# Patient Record
Sex: MALE | Race: WHITE
[De-identification: names, ages, dates, MRNs, and addresses within clinical notes are randomized per-mention and may not be internally consistent; named-entity substitution may affect disease eponyms.]

---

## 2018-10-14 ENCOUNTER — HOSPITAL ENCOUNTER (INPATIENT)
Dept: HOSPITAL 62 - ER | Age: 63
LOS: 1 days | Discharge: HOME | DRG: 310 | End: 2018-10-15
Attending: STUDENT IN AN ORGANIZED HEALTH CARE EDUCATION/TRAINING PROGRAM | Admitting: STUDENT IN AN ORGANIZED HEALTH CARE EDUCATION/TRAINING PROGRAM
Payer: COMMERCIAL

## 2018-10-14 DIAGNOSIS — Z82.49: ICD-10-CM

## 2018-10-14 DIAGNOSIS — Z82.3: ICD-10-CM

## 2018-10-14 DIAGNOSIS — I10: ICD-10-CM

## 2018-10-14 DIAGNOSIS — R06.83: ICD-10-CM

## 2018-10-14 DIAGNOSIS — Z80.0: ICD-10-CM

## 2018-10-14 DIAGNOSIS — I48.91: Primary | ICD-10-CM

## 2018-10-14 LAB
ADD MANUAL DIFF: NO
ALBUMIN SERPL-MCNC: 4.4 G/DL (ref 3.5–5)
ALP SERPL-CCNC: 72 U/L (ref 38–126)
ALT SERPL-CCNC: 28 U/L (ref 21–72)
ANION GAP SERPL CALC-SCNC: 10 MMOL/L (ref 5–19)
AST SERPL-CCNC: 21 U/L (ref 17–59)
BASOPHILS # BLD AUTO: 0.1 10^3/UL (ref 0–0.2)
BASOPHILS NFR BLD AUTO: 1.2 % (ref 0–2)
BILIRUB DIRECT SERPL-MCNC: 0.3 MG/DL (ref 0–0.4)
BILIRUB SERPL-MCNC: 1 MG/DL (ref 0.2–1.3)
BUN SERPL-MCNC: 14 MG/DL (ref 7–20)
CALCIUM: 10.1 MG/DL (ref 8.4–10.2)
CHLORIDE SERPL-SCNC: 102 MMOL/L (ref 98–107)
CHOLEST SERPL-MCNC: 135.39 MG/DL (ref 0–200)
CK MB SERPL-MCNC: 1.39 NG/ML (ref ?–4.55)
CK SERPL-CCNC: 80 U/L (ref 55–170)
CO2 SERPL-SCNC: 30 MMOL/L (ref 22–30)
EOSINOPHIL # BLD AUTO: 0 10^3/UL (ref 0–0.6)
EOSINOPHIL NFR BLD AUTO: 1 % (ref 0–6)
ERYTHROCYTE [DISTWIDTH] IN BLOOD BY AUTOMATED COUNT: 12.7 % (ref 11.5–14)
GLUCOSE SERPL-MCNC: 132 MG/DL (ref 75–110)
HCT VFR BLD CALC: 47.1 % (ref 37.9–51)
HGB BLD-MCNC: 16.2 G/DL (ref 13.5–17)
INR PPP: 0.97
LDLC SERPL DIRECT ASSAY-MCNC: 78 MG/DL (ref ?–100)
LYMPHOCYTES # BLD AUTO: 1.3 10^3/UL (ref 0.5–4.7)
LYMPHOCYTES NFR BLD AUTO: 27.5 % (ref 13–45)
MCH RBC QN AUTO: 32.6 PG (ref 27–33.4)
MCHC RBC AUTO-ENTMCNC: 34.5 G/DL (ref 32–36)
MCV RBC AUTO: 94 FL (ref 80–97)
MONOCYTES # BLD AUTO: 0.6 10^3/UL (ref 0.1–1.4)
MONOCYTES NFR BLD AUTO: 12.3 % (ref 3–13)
NEUTROPHILS # BLD AUTO: 2.7 10^3/UL (ref 1.7–8.2)
NEUTS SEG NFR BLD AUTO: 58 % (ref 42–78)
PLATELET # BLD: 234 10^3/UL (ref 150–450)
POTASSIUM SERPL-SCNC: 4.4 MMOL/L (ref 3.6–5)
PROT SERPL-MCNC: 7.3 G/DL (ref 6.3–8.2)
PROTHROMBIN TIME: 13.4 SEC (ref 11.4–15.4)
RBC # BLD AUTO: 4.98 10^6/UL (ref 4.35–5.55)
SODIUM SERPL-SCNC: 141.6 MMOL/L (ref 137–145)
TOTAL CELLS COUNTED % (AUTO): 100 %
TRIGL SERPL-MCNC: 166 MG/DL (ref ?–150)
TROPONIN I SERPL-MCNC: < 0.012 NG/ML
VLDLC SERPL CALC-MCNC: 33.2 MG/DL (ref 10–31)
WBC # BLD AUTO: 4.6 10^3/UL (ref 4–10.5)

## 2018-10-14 PROCEDURE — 82550 ASSAY OF CK (CPK): CPT

## 2018-10-14 PROCEDURE — 71045 X-RAY EXAM CHEST 1 VIEW: CPT

## 2018-10-14 PROCEDURE — 80048 BASIC METABOLIC PNL TOTAL CA: CPT

## 2018-10-14 PROCEDURE — 82553 CREATINE MB FRACTION: CPT

## 2018-10-14 PROCEDURE — 93005 ELECTROCARDIOGRAM TRACING: CPT

## 2018-10-14 PROCEDURE — 84443 ASSAY THYROID STIM HORMONE: CPT

## 2018-10-14 PROCEDURE — 80053 COMPREHEN METABOLIC PANEL: CPT

## 2018-10-14 PROCEDURE — 99291 CRITICAL CARE FIRST HOUR: CPT

## 2018-10-14 PROCEDURE — 83735 ASSAY OF MAGNESIUM: CPT

## 2018-10-14 PROCEDURE — 93010 ELECTROCARDIOGRAM REPORT: CPT

## 2018-10-14 PROCEDURE — 36415 COLL VENOUS BLD VENIPUNCTURE: CPT

## 2018-10-14 PROCEDURE — 85610 PROTHROMBIN TIME: CPT

## 2018-10-14 PROCEDURE — 84484 ASSAY OF TROPONIN QUANT: CPT

## 2018-10-14 PROCEDURE — 93306 TTE W/DOPPLER COMPLETE: CPT

## 2018-10-14 PROCEDURE — 80061 LIPID PANEL: CPT

## 2018-10-14 PROCEDURE — 96374 THER/PROPH/DIAG INJ IV PUSH: CPT

## 2018-10-14 PROCEDURE — 85025 COMPLETE CBC W/AUTO DIFF WBC: CPT

## 2018-10-14 PROCEDURE — 85730 THROMBOPLASTIN TIME PARTIAL: CPT

## 2018-10-14 RX ADMIN — FAMOTIDINE SCH MG: 20 TABLET, FILM COATED ORAL at 21:11

## 2018-10-14 RX ADMIN — METOPROLOL TARTRATE SCH MG: 25 TABLET, FILM COATED ORAL at 21:12

## 2018-10-14 RX ADMIN — METOPROLOL TARTRATE SCH MG: 25 TABLET, FILM COATED ORAL at 15:11

## 2018-10-14 RX ADMIN — APIXABAN SCH MG: 5 TABLET, FILM COATED ORAL at 21:12

## 2018-10-14 RX ADMIN — APIXABAN SCH MG: 5 TABLET, FILM COATED ORAL at 15:12

## 2018-10-14 NOTE — ER DOCUMENT REPORT
ED Cardiac





- General


Chief Complaint: Arrhythmia


Stated Complaint: BREATHING PROBLEM


Time Seen by Provider: 10/14/18 12:16


TRAVEL OUTSIDE OF THE [place] IN LAST 30 DAYS: No





- HPI


Notes: 





Patient is a 64 yo male that presents to the emergency department for chief 

complaint of palpitations and near syncope.


Patient presents for shortness of breath, palpitations and lightheadedness.  He 

states that he felt like his heart was beating fast and irregular.  During this 

time he felt like he could not catch his breath and he was going to pass out.  

The symptoms greatly resolved just prior to arrival.  He currently states he 

does not feel lightheaded.  He did report a tightness in his chest with this 

that he describes as a hard time breathing.  He has had similar symptoms over 

the last few months that have only lasted for a few minutes at a time and he 

has never felt this lightheaded with them before.  He denies history of atrial 

fibrillation or cardiac disease.  He has a history of hypertension and takes 1 

lisinopril daily which he did take today.  He does report that he is under 

increased stress recently.





Past Medical History: Hypertension





Past Surgical History: Reviewed





Social History: Occasional alcohol.  Denies tobacco and drug use





Family History: Reviewed and noncontributory for presenting illness





Allergies: Reviewed, see documented allergy list.








REVIEW OF SYSTEMS:





CONSTITUTIONAL : 





No fever





No chills





No diaphoresis





No recent illness





EENT:





No vision changes





No congestion





No sore throat  





CARDIOVASCULAR:





Nchest pain





palpitations





RESPIRATORY:





shortness of breath





No cough





No difficulty breathing





GASTROINTESTINAL: 





No abdominal pain





No nausea





No vomiting





No diarrhea





GENITOURINARY:





No dysuria





No hematuria





No difficulty urinating





MUSCULOSKELETAL:





No back pain





No leg pain





No arm pain





SKIN:  





No rashes





No lesions





LYMPHATIC: 





No swollen, enlarged glands.





NEUROLOGICAL: 





No lightheadedness





No headache





No weakness





No paresthesias





PSYCHIATRIC:





No anxiety





No depression 








PHYSICAL EXAMINATION:





Vital signs reviewed, nursing noted reviewed.





GENERAL: Well-appearing, well-nourished and in no acute distress.





HEAD: Atraumatic, normocephalic.





EYES: Eyes appear normal, extraocular movements intact, sclera anicteric, 

conjunctiva are normal.





ENT: nares patent, oropharynx clear without exudates.  Moist mucous membranes.





NECK: Normal range of motion, supple without lymphadenopathy





LUNGS: Breath sounds clear to auscultation bilaterally and equal.  No wheezes 

rales or rhonchi.





HEART: Irregularly irregular and tachycardic





ABDOMEN: Soft, nontender, normoactive bowel sounds.  No rebound, guarding, or 

rigidity. No masses appreciated.





EXTREMITIES: Nontender, good range of motion, no pitting or edema. 





NEUROLOGICAL: No focal neurological deficits. Moves all extremities 

spontaneously Motor and sensory grossly intact on exam.





PSYCH: Normal mood, normal affect.





SKIN: Warm, Dry, normal turgor, no rashes or lesions noted on exposed skin











- Related Data


Allergies/Adverse Reactions: 


 





No Known Allergies Allergy (Unverified 10/14/18 12:11)


 











Past Medical History





- Social History


Smoking Status: Never Smoker


Chew tobacco use (# tins/day): No


Frequency of alcohol use: None


Family History: Reviewed & Not Pertinent


Patient has suicidal ideation: No


Patient has homicidal ideation: No





- Past Medical History


Cardiac Medical History: Reports: Hx Hypertension


Renal/ Medical History: Denies: Hx Peritoneal Dialysis





Review of Systems





- Review of Systems


Notes: 





Dictated





Physical Exam





- Vital signs


Vitals: 





 











Temp Pulse Resp BP Pulse Ox


 


 97.4 F   111 H  14   135/96 H  97 


 


 10/14/18 11:45  10/14/18 11:45  10/14/18 11:45  10/14/18 11:45  10/14/18 11:45














- Notes


Notes: 





Dictated





Course





- Re-evaluation


Re-evalutation: 





10/14/18 14:00


Vitals reviewed.  Nursing notes reviewed.  EKG shows A. fib with RVR.  Patient 

was given Cardizem which improved his rate and placed on Cardizem infusion for 

further rate control.  His blood pressure has remained stable.  Lab work is 

unremarkable.  Chest x-ray shows no acute process.  This is new onset A. fib 

and he is not anticoagulated.  I discussed his care with Dr. Min who will see 

him in the emergency room on consultation.  He will be admitted to the hospital 

for further telemetry monitoring and treatment.  Case discussed with Dr. Ryder 

who accepted admission.





Laboratory











  10/14/18 10/14/18 10/14/18





  12:05 12:05 12:05


 


WBC  4.6  


 


RBC  4.98  


 


Hgb  16.2  


 


Hct  47.1  


 


MCV  94  


 


MCH  32.6  


 


MCHC  34.5  


 


RDW  12.7  


 


Plt Count  234  


 


Seg Neutrophils %  58.0  


 


Lymphocytes %  27.5  


 


Monocytes %  12.3  


 


Eosinophils %  1.0  


 


Basophils %  1.2  


 


Absolute Neutrophils  2.7  


 


Absolute Lymphocytes  1.3  


 


Absolute Monocytes  0.6  


 


Absolute Eosinophils  0.0  


 


Absolute Basophils  0.1  


 


Sodium   141.6 


 


Potassium   4.4 


 


Chloride   102 


 


Carbon Dioxide   30 


 


Anion Gap   10 


 


BUN   14 


 


Creatinine   0.95 


 


Est GFR ( Amer)   > 60 


 


Est GFR (Non-Af Amer)   > 60 


 


Glucose   132 H 


 


Calcium   10.1 


 


Total Bilirubin   1.0 


 


Direct Bilirubin   0.3 


 


Neonat Total Bilirubin   Not Reportable 


 


Neonat Direct Bilirubin   Not Reportable 


 


Neonat Indirect Bili   Not Reportable 


 


AST   21 


 


ALT   28 


 


Alkaline Phosphatase   72 


 


Creatine Kinase   80 


 


CK-MB (CK-2)    1.39


 


Troponin I    < 0.012


 


Total Protein   7.3 


 


Albumin   4.4 














 





Chest X-Ray  10/14/18 12:12


IMPRESSION:  NO ACUTE FINDINGS.


 














- Vital Signs


Vital signs: 





 











Temp Pulse Resp BP Pulse Ox


 


 97.4 F   111 H  14   135/96 H  97 


 


 10/14/18 11:45  10/14/18 11:45  10/14/18 11:45  10/14/18 11:45  10/14/18 11:45














- Laboratory


Result Diagrams: 


 10/14/18 12:05





 10/14/18 12:05


Laboratory results interpreted by me: 





 











  10/14/18





  12:05


 


Glucose  132 H














- EKG Interpretation by Me


Additional EKG results interpreted by me: 





10/14/18 14:01


Interpreted by myself


1147: Atrial fibrillation, rate 106, normal axis, no ectopy, normal QTC, no ST 

elevation





Critical Care Note





- Critical Care Note


Total time excluding time spent on procedures (mins): 35 - Cardiac dysrhythmia 

requiring Cardizem infusion, conversations with specialist, conversations with 

family, potential for cardiovascular decompensation.





Discharge





- Discharge


Clinical Impression: 


 Atrial fibrillation with RVR





Condition: Stable


Disposition: ADMITTED AS OBSERVATION


Admitting Provider: Hospitalist


Unit Admitted: [facility]

## 2018-10-14 NOTE — PDOC CONSULTATION
Consultation


Consult Date: 10/14/18


Attending physician:: KISHA TSE


Consult reason:: Atrial fibrillation with rapid ventricular response and near 

syncope





History of Present Illness


Admission Date/PCP: 


  10/14/18 15:20





  JACKIE JESUS MD





Patient complains of: Near syncope


History of Present Illness: 


OPAL GARZA is a 63 year old male that presents to the emergency department 

for chief complaint of palpitations and near syncope.


Patient presents for shortness of breath, palpitations and lightheadedness.  He 

states that he felt like his heart was beating fast and irregular.  During this 

time he felt like he could not catch his breath and he was going to pass out.  

The symptoms greatly resolved just prior to arrival.  He currently states he 

does not feel lightheaded.  He did report a tightness in his chest with this 

that he describes as a hard time breathing.  He has had similar symptoms over 

the last few months that have only lasted for a few minutes at a time and he 

has never felt this lightheaded with them before.  He denies history of atrial 

fibrillation or cardiac disease.  He has a history of hypertension and takes 1 

lisinopril daily which he did take today.  He does report that he is under 

increased stress recently.


This history obtained by the ER physician was reviewed and confirmed with the 

patient.  Patient wife also interviewed.  Patient wife claims that Mr. Garza 

had helped with moving some furniture and loading them onto a truck to be 

transferred to [place] beach house.  However while driving his truck, he felt 

dizzy, lightheaded and claims that he might pass out.  He also mentioned to his 

wife that he was going to have a heart attack.  On questioning he admitted to 

having a queasy feeling and pressure type sensation in the epigastrium but not 

in the chest.  He was noted to be somewhat pale and sweaty.  At this point, he 

pulled over, the wife took over the wheels and drove him to the ER.  On 

questioning patient admitted to having lightheadedness off and on for last 3 

months.  He also describes history of having skipped beats off and on.  When I 

saw the patient again he was having an echocardiogram performed.  He was noted 

to be in atrial fibrillation at that time as well.  When patient was asked if 

he felt his heart beating irregularly at that time, patient denied feeling 

this.  Patient just cannot be sure whether he had atrial fibrillation starting 

today or if he had had this in the last several months at least intermittently.

  2D echocardiogram, preliminary report shows normal LVEF, left atrium WNL.  No 

significant mitral stenosis noted.


Patient does have history of snoring and some daytime fatigue and sleepiness.  

Patient does not sleep well at night.  He is stressed out at work.





Past Medical History


Cardiac Medical History: Reports: Hypertension


Psychiatric Medical History: 


   Denies: Depression





Past Surgical History


Past Surgical History: Reports: None





Social History


Information Source: Patient


Smoking Status: Never Smoker


Frequency of Alcohol Use: Rare


Hx Recreational Drug Use: No


Drugs: None





- Advance Directive


Resuscitation Status: Full Code


Surrogate healthcare decision maker:: 





Patient's wife





Family History


Family History: CAD, CVA - Mother, Malignancy - Colon cancer father


Parental Family History Reviewed: Yes


Children Family History Reviewed: Yes


Sibling(s) Family History Reviewed.: Yes





Medication/Allergy


Home Medications: 








Lisinopril [Lisinopril] 5 mg PO DAILY 10/14/18 








Allergies/Adverse Reactions: 


 





No Known Allergies Allergy (Unverified 10/14/18 12:11)


 











Review of Systems


Review of Systems: 





Please see history of present illness and past medical history as wall.


Constitutional: No fever or chills reported.


Head : No recent chronic headaches, recent head injury.


Eyes: No recent eye pain, diplopia, redness, discharge, acute visual changes.


Ears: No recent chronic ear pain, acute hearing loss, ear discharge.


Oral cavity: No recent  ulcerations, bleeding, oral cavity discomfort.


Neck: No recent acute neck pain reported.


Hematologic: No recent easy bruising or bleeding.


Lymphatic: No recent lymph node enlargement reported.


Cardiovascular system review: See history of present illness.


Respiratory system review: No hemoptysis or blood clots in the lungs reported. 

Mild Shortness of breath on exertion


Gastrointestinal system review: Negative for any recent acute hematemesis, 

melena.  


Genitourinary system review: No recent acute or chronic hematuria, flank pain, 

UTI etc. reported.


Skin system review: Negative for any recent abnormal bruising, no rash, no 

pruritus reported.


Neurologic: No prior history of strokes, mini strokes, seizure disorder.  

History of snoring and daytime sleepiness.


Psychologic: No history of major psychosis or major depression reported.


Musculoskeletal: Minor aches and pains reported.  No acute joint swelling 

reported.


Endocrine: No recent polyuria, polydipsia, recent heat or cold intolerance.





Physical Exam


Vital Signs: 


 











Temp Pulse Resp BP Pulse Ox


 


 98.0 F   79   15   109/76   95 


 


 10/14/18 16:27  10/14/18 16:27  10/14/18 16:27  10/14/18 16:27  10/14/18 16:27








 Intake & Output











 10/13/18 10/14/18 10/15/18





 06:59 06:59 06:59


 


Weight   85.6 kg











Exam: 








GENERAL: well-nourished and in no acute distress.  Alert and oriented x3


HEAD: Atraumatic, normocephalic.


EYES: Pupils equal round and reactive to light, extraocular movements intact, 

sclera anicteric, conjunctiva are normal.


ENT: TMs normal, nares patent, oropharynx clear without exudates. Moist mucous 

membranes.  No oral ulcerations or bleeding gums noted.  Narrow palate noted


NECK: supple without lymphadenopathy.  Trachea is central.  No cervical or 

axillary lymphadenopathy noted.  Carotids are 2+, JVD WNL 


LUNGS: Respiration seems nonlabored, no significant accessory muscle action 

noted.  Breath sounds clear to auscultation bilaterally and equal noted. No 

wheezes rales or rhonchi noted.  No significant dullness noted on percussion.


CHEST: Palpation of the chest wall shows no significant chest wall tenderness. 


HEART: [place] NP, No PSH,  1/6 ARSH aortic area, 1/6 pan systolic murmur mitral 

area, no rubs, no gallops.


ABDOMEN: Soft, no significant tenderness appreciated, normoactive bowel sounds. 

No guarding, no rebound.  No rigidity noted . No masses appreciated.


EXTREMITIES: Pedal pulses are 1-2+, no calf tenderness noted. No clubbing or 

cyanosis. negative pedal edema noted


NEUROLOGICAL: Focused neurological exam showed no significant neurologic 

deficit. Normal speech, no focal weakness appreciated. 


PSYCH: Normal mood, normal affect.  Judgment and insight within normal limits.


SKIN: No significant ecchymosis, skin is noted to be warm.


MUSCULOSKELETAL EXAM: No significant acute joint swelling noted.





Results


EKG Comments: 





Atrial fibrillation with rapid ventricular response but no acute ST-T wave 

changes are noted.


Impressions: 


 





Chest X-Ray  10/14/18 12:12


IMPRESSION:  NO ACUTE FINDINGS.


 














Assessment & Plan





- Diagnosis


(1) Atrial fibrillation with RVR


Is this a current diagnosis for this admission?: Yes   





(2) Hypertension


Qualifiers: 


   Hypertension type: essential hypertension   Qualified Code(s): I10 - 

Essential (primary) hypertension   


Is this a current diagnosis for this admission?: Yes   





(3) Near syncope


Is this a current diagnosis for this admission?: Yes   





(4) Epigastric discomfort


Is this a current diagnosis for this admission?: Yes   





(5) Snoring


Is this a current diagnosis for this admission?: Yes   





- Notes


Notes: 





Atrial fibrillation with rapid ventricular response: Patient noted to have this 

condition.  Exact onset is not clear.  Patient could have been having at least 

paroxysmal spells for last few months.  At this point, would recommend rate 

control and anticoagulation.  Since patient has history of near syncope, beta-

blocker will be preferred agent for rate control.  Sometimes atrial 

fibrillation with RVR can precipitate neurocardiogenic type reflex.  This 

reflex is usually precipitated because of underfilling of the heart.


More definitive treatment of atrial fibrillation discussed.  There is a good 

chance that patient may convert spontaneously, if not then will recommend 

transesophageal echocardiogram guided cardioversion or just cardioversion after 

3-4 weeks of chronic anticoagulation.  Discussed antiarrhythmics as well as 

ablation therapy however these will need to be decided by the 

electrophysiologist.  We will be happy to make a referral as an outpatient.


Near syncope: Most likely related to atrial fibrillation with RVR.  Patient 

will benefit from improving hydration.  Discussed that underfilling of the 

heart could cause neurocardiogenic type reflex being activated.


Hypertension: Continue lisinopril.


Epigastric discomfort: Could be part and parcel of neurocardiogenic reflex but 

patient will benefit from stress test.  This can be scheduled as an outpatient.


Snoring: Discussed association of sleep apnea with increased risk of atrial 

fibrillation as his hypertension.  Patient does have some symptoms and 

oropharyngeal exam indicative of underlying probability of intermediate 

strength of having sleep apnea syndrome.  Patient will benefit from being 

testing for it.





- Time


Time Spent: 30 to 50 Minutes


Medications reviewed and adjusted accordingly: Yes

## 2018-10-14 NOTE — EKG REPORT
SEVERITY:- ABNORMAL ECG -

ATRIAL FIBRILLATION

PROBABLE POSTERIOR INFARCT

:

Confirmed by: Paula Carreon MD 14-Oct-2018 19:45:00

## 2018-10-14 NOTE — RADIOLOGY REPORT (SQ)
EXAM DESCRIPTION:  CHEST SINGLE VIEW



COMPLETED DATE/TIME:  10/14/2018 1:17 pm



REASON FOR STUDY:  SOB



COMPARISON:  8/21/2007



TECHNIQUE:  Single frontal radiographic view of the chest acquired.



NUMBER OF VIEWS:  One view.



LIMITATIONS:  None.



FINDINGS:  LUNGS AND PLEURA: No pneumothorax. No consolidation or pleural effusion.

MEDIASTINUM AND HILAR STRUCTURES: Stable.

HEART AND VASCULAR STRUCTURES: Stable.

BONES: No acute findings.

HARDWARE: None in the chest.

OTHER: No other significant finding.



IMPRESSION:  NO ACUTE FINDINGS.



TECHNICAL DOCUMENTATION:  JOB ID:  [id number]

[id number]

 2011 [employer]- All Rights Reserved



Reading location - IP/workstation name: [employer]

## 2018-10-14 NOTE — XCELERA REPORT
[address] [address]

                               Tel: [phone]

                               Fax: [phone]



                      Transthoracic Echocardiogram Report

_______________________________________________________________________________



Name: OPAL CAMARA

MRN: [id number]                           Age: 63 yrs

Gender: Male                              : 1955

Patient Status: Inpatient                 Patient Location: [address] [place] [address]

Account #: [id number]

Study Date: 10/14/2018 04:44 PM

Accession #: [id number]

_______________________________________________________________________________



Height: 71 in        Weight: 190 lb        BSA: 2.1 m2

_______________________________________________________________________________

Procedure: A complete two-dimensional transthoracic echocardiogram was

performed (2D, M-mode, spectral and color flow Doppler). The study was

technically difficult with many images being suboptimal in quality.

Reason For Study: Atrial fibrillation





Ordering Physician: KISHA TSE

Performed By: Estrella Dunaway



_______________________________________________________________________________



Interpretation Summary

The left ventricular ejection fraction is normal.

There is borderline concentric left ventricular hypertrophy.

Doppler measurements suggest pseudonormalized left ventricular relaxation,

which is associated with grade II/IV or mild to moderate diastolic dysfunction

The left ventricle is grossly normal size.

Wall motion cannot be accurately commented on, but no definite regional wall

motion abnormalities noted.

The right ventricular systolic function is normal.

The left atrial size is normal.

The right atrium is normal in size

There is a trace amount of mitral regurgitation

There is no mitral valve stenosis.

There is no aortic valve stenosis

No aortic regurgitation is present.

There is a trace or physiologic amount of tricuspid regurgitation

Tricuspid regurgitation jet envelope not well defined to measure RV systolic

pressure accurately.

The aortic root is not well visualized but is probably normal size.

The inferior vena cava was not well visualized

There is no pericardial effusion.



MMode/2D Measurements & Calculations

RVDd: 2.6 cm  LVIDd: 4.9 cm   FS: 37.5 %              Ao root diam: 3.4 cm

IVSd: 1.1 cm  LVIDs: 3.1 cm   EDV(Teich): 115.2 ml

                                                      Ao root area: 9.2 cm2

              LVPWd: 0.95 cm  ESV(Teich): 37.6 ml     LA dimension: 2.9 cm

                              EF(Teich): 67.4 %



Doppler Measurements & Calculations

MV E max deshaun:      MV P1/2t max deshaun:     Ao V2 max:         LV V1 max P.5 cm/sec        91.1 cm/sec           97.1 cm/sec        4.1 mmHg

MV A max deshaun:      MV P1/2t: 47.3 msec   Ao max PG: 3.8 mmHgLV V1 max:

42.0 cm/sec        MVA(P1/2t): 4.6 cm2                      101.7 cm/sec

MV E/A: 1.8        MV dec slope:



                   563.5 cm/sec2

                   MV dec time: 0.22 sec

        _______________________________________________________________

PA V2 max:         TR max deshaun:           MV P1/2t-pr_phl:

86.0 cm/sec        185.9 cm/sec          47.3 msec

PA max PG:         TR max P.8 mmHg

3.0 mmHg





Left Ventricle

The left ventricle is grossly normal size. There is borderline concentric left

ventricular hypertrophy. The left ventricular ejection fraction is normal.

Doppler measurements suggest pseudonormalized left ventricular relaxation,

which is associated with grade II/IV or mild to moderate diastolic

dysfunction. Wall motion cannot be accurately commented on, but no definite

regional wall motion abnormalities noted.



Right Ventricle

The right ventricle is grossly normal size. There is normal right ventricular

wall thickness. The right ventricular systolic function is normal.



Atria

The right atrium is normal in size. The left atrial size is normal.

Interarterial septum not well visualized and not well dopplered. Cannot

comment on ASD/PFO presence.



Mitral Valve

The mitral valve is grossly normal. There is no mitral valve stenosis. There

is a trace amount of mitral regurgitation.



Aortic Valve

The aortic valve is not well visualized secondary to technical limitations.

There is no aortic valve stenosis. No aortic regurgitation is present.





Tricuspid Valve

The tricuspid valve is not well visualized secondary to technical limitations.

There is no tricuspid stenosis. There is a trace or physiologic amount of

tricuspid regurgitation. Tricuspid regurgitation jet envelope not well defined

to measure RV systolic pressure accurately.



Pulmonic Valve

The pulmonic valve is not well visualized.



Great Vessels

The aortic root is not well visualized but is probably normal size. The

inferior vena cava was not well visualized.



Effusions

There is no pericardial effusion.





_______________________________________________________________________________

_______________________________________________________________________________



Electronically signed by:      Chucho Min      on 10/14/2018 07:35 PM



CC: KISHA TSE

>

Chucho Min

## 2018-10-14 NOTE — PDOC H&P
History of Present Illness


Admission Date/PCP: 


  





  JACKIE JESUS MD





Patient complains of: Rapid heart rate


History of Present Illness: 


OPAL CAMARA is a 63 year old male who presents the emergency room with a 

complaint of fluttering in his chest.  Patient is in excellent health takes 

lisinopril 10 mg a day for hypertension which is his only medical problem.  He 

relates a history over the past year where he would periodically experience a 

thumping in his chest lasting 1 or 2 seconds nothing that lasted longer than 

that.  It did not seem to be related to activity and there was never chest pain 

pressure associated with it.  Today patient was moving some furniture with his 

wife when he experienced a "thump" in his chest followed by a feeling of 

irregular heart rhythm.  He was somewhat lightheaded and this caused him to 

come to the emergency room.  The emergency room patient was found to have 

atrial fibrillation with a rate of 117 he was given a low-dose of IV Cardizem 

and a drip was ordered.  His heart rate decreased into the 90s but remained 

irregular.  Initial laboratory studies were unremarkable his troponin was 

negative.  A request for admission was made.  At the time of his evaluation Dr. Mechelle bermeo was present in the room.  Patient will be admitted to a 

telemetry bed.








Past Medical History


Cardiac Medical History: Reports: Hypertension





Past Surgical History


Past Surgical History: Reports: None





Social History


Smoking Status: Never Smoker


Frequency of Alcohol Use: Rare


Drugs: None





- Advance Directive


Resuscitation Status: Full Code





Family History


Family History: CAD, CVA - Mother, Malignancy - Colon cancer father


Parental Family History Reviewed: Yes


Children Family History Reviewed: Yes


Sibling(s) Family History Reviewed.: Yes





Medication/Allergy


Home Medications: 








Lisinopril [Lisinopril] 5 mg PO DAILY 10/14/18 








Allergies/Adverse Reactions: 


 





No Known Allergies Allergy (Unverified 10/14/18 12:11)


 











Review of Systems


Constitutional: ABSENT: chills, fever(s), headache(s), weight gain, weight loss


Eyes: ABSENT: visual disturbances


Ears: ABSENT: hearing changes


Cardiovascular: PRESENT: as per HPI, palpitations.  ABSENT: chest pain, dyspnea 

on exertion, edema, orthropnea


Respiratory: ABSENT: cough, hemoptysis


Gastrointestinal: ABSENT: abdominal pain, constipation, diarrhea, hematemesis, 

hematochezia, nausea, vomiting


Genitourinary: ABSENT: dysuria, hematuria


Musculoskeletal: ABSENT: joint swelling


Integumentary: ABSENT: rash, wounds


Neurological: ABSENT: abnormal gait, abnormal speech, confusion, dizziness, 

focal weakness, syncope


Psychiatric: ABSENT: anxiety, depression, homidical ideation, suicidal ideation


Endocrine: ABSENT: cold intolerance, heat intolerance, polydipsia, polyuria


Hematologic/Lymphatic: ABSENT: easy bleeding, easy bruising





Physical Exam


Vital Signs: 


 











Temp Pulse Resp BP Pulse Ox


 


 97.4 F   111 H  14   135/96 H  97 


 


 10/14/18 11:45  10/14/18 11:45  10/14/18 11:45  10/14/18 11:45  10/14/18 11:45








 Intake & Output











 10/13/18 10/14/18 10/15/18





 06:59 06:59 06:59


 


Weight   87.4 kg











General appearance: PRESENT: no acute distress, well-developed, well-nourished


Head exam: PRESENT: atraumatic, normocephalic


Eye exam: PRESENT: conjunctiva pink, EOMI, PERRLA.  ABSENT: scleral icterus


Ear exam: PRESENT: normal external ear exam


Mouth exam: PRESENT: moist, tongue midline


Neck exam: ABSENT: carotid bruit, JVD, lymphadenopathy, thyromegaly


Respiratory exam: PRESENT: clear to auscultation shawn.  ABSENT: rales, rhonchi, 

wheezes


Cardiovascular exam: PRESENT: irregular rhythm.  ABSENT: diastolic murmur, rubs

, systolic murmur


Pulses: PRESENT: normal dorsalis pedis pul


GI/Abdominal exam: PRESENT: normal bowel sounds, soft.  ABSENT: distended, 

guarding, mass, organolmegaly, rebound, tenderness


Extremities exam: PRESENT: full ROM.  ABSENT: calf tenderness, clubbing, pedal 

edema


Musculoskeletal exam: PRESENT: ambulatory, other.  ABSENT: deformity, 

dislocation, full ROM, normal inspection, tenderness


Neurological exam: PRESENT: alert, awake, oriented to person, oriented to place

, oriented to time, oriented to situation, CN II-XII grossly intact.  ABSENT: 

motor sensory deficit


Psychiatric exam: PRESENT: appropriate affect, normal mood.  ABSENT: homicidal 

ideation, suicidal ideation


Skin exam: PRESENT: dry, intact, warm.  ABSENT: cyanosis, rash





Results


Laboratory Results: 


 





 10/14/18 12:05 





 10/14/18 12:05 





 











  10/14/18 10/14/18





  12:05 12:05


 


WBC  4.6 


 


RBC  4.98 


 


Hgb  16.2 


 


Hct  47.1 


 


MCV  94 


 


MCH  32.6 


 


MCHC  34.5 


 


RDW  12.7 


 


Plt Count  234 


 


Seg Neutrophils %  58.0 


 


Lymphocytes %  27.5 


 


Monocytes %  12.3 


 


Eosinophils %  1.0 


 


Basophils %  1.2 


 


Absolute Neutrophils  2.7 


 


Absolute Lymphocytes  1.3 


 


Absolute Monocytes  0.6 


 


Absolute Eosinophils  0.0 


 


Absolute Basophils  0.1 


 


Sodium   141.6


 


Potassium   4.4


 


Chloride   102


 


Carbon Dioxide   30


 


Anion Gap   10


 


BUN   14


 


Creatinine   0.95


 


Est GFR ( Amer)   > 60


 


Est GFR (Non-Af Amer)   > 60


 


Glucose   132 H


 


Calcium   10.1


 


Total Bilirubin   1.0


 


AST   21


 


ALT   28


 


Alkaline Phosphatase   72


 


Total Protein   7.3


 


Albumin   4.4








 











  10/14/18 10/14/18





  12:05 12:05


 


Creatine Kinase  80 


 


CK-MB (CK-2)   1.39


 


Troponin I   < 0.012











Impressions: 


 





Chest X-Ray  10/14/18 12:12


IMPRESSION:  NO ACUTE FINDINGS.


 














Assessment & Plan





- Diagnosis


(1) Atrial fibrillation with RVR


Is this a current diagnosis for this admission?: Yes   


Plan: 


Case discussed with Dr. Min.  Will discontinue Cardizem drip and initiate 

metoprolol 25 mg twice daily.  Patient's chads 2 vasc score is 1 with a 1.3% 

chance of annual stroke.  However as patient has what appears to be new onset 

atrial fibrillation and GRACE is not available at this facility will initiate 

Eliquis 5 mg twice daily.  Risks and benefits of anticoagulation have been 

discussed with the patient.  We will obtain a stat echocardiogram to assess 

overall LV function.  Does appear patient has lone atrial fibrillation and it 

is hopeful that he will convert back to sinus rhythm.  We will discontinue his 

lisinopril starting with the metoprolol and monitor closely for development of 

bradycardia as patient has a relatively slow response to his atrial 

fibrillation.  Further recommendations by Dr. Min will follow.  Treatment 

options discussed with patient he is agreeable to proceeding.  Check TSH serial 

troponins.  Check lipid panel in a.m. assess patient for cardiac risk factors 

may benefit from stress test as an outpatient.








(2) Hypertension


Is this a current diagnosis for this admission?: Yes   


Plan: 


Stop lisinopril initiate beta-blocker monitor blood pressure closely.








- Time


Time Spent: 50 to 70 Minutes


Anticipated discharge: Home


Within: within 48 hours





- Inpatient Certification


Based on my medical assessment, after consideration of the patient's 

comorbidities, presenting symptoms, or acuity I expect that the services needed 

warrant INPATIENT care.: Yes


I certify that my determination is in accordance with my understanding of 

Medicare's requirements for reasonable and necessary INPATIENT services [42 CFR 

412.3e].: Yes


Medical Necessity: Need Close Monitoring Due to Risk of Patient Decompensation

## 2018-10-15 VITALS — DIASTOLIC BLOOD PRESSURE: 65 MMHG | SYSTOLIC BLOOD PRESSURE: 113 MMHG

## 2018-10-15 LAB
ADD MANUAL DIFF: NO
ANION GAP SERPL CALC-SCNC: 7 MMOL/L (ref 5–19)
BASOPHILS # BLD AUTO: 0 10^3/UL (ref 0–0.2)
BASOPHILS NFR BLD AUTO: 0.5 % (ref 0–2)
BUN SERPL-MCNC: 14 MG/DL (ref 7–20)
CALCIUM: 9.1 MG/DL (ref 8.4–10.2)
CHLORIDE SERPL-SCNC: 105 MMOL/L (ref 98–107)
CHOLEST SERPL-MCNC: 117.74 MG/DL (ref 0–200)
CO2 SERPL-SCNC: 30 MMOL/L (ref 22–30)
EOSINOPHIL # BLD AUTO: 0.1 10^3/UL (ref 0–0.6)
EOSINOPHIL NFR BLD AUTO: 1 % (ref 0–6)
ERYTHROCYTE [DISTWIDTH] IN BLOOD BY AUTOMATED COUNT: 12.4 % (ref 11.5–14)
GLUCOSE SERPL-MCNC: 97 MG/DL (ref 75–110)
HCT VFR BLD CALC: 44 % (ref 37.9–51)
HGB BLD-MCNC: 15.3 G/DL (ref 13.5–17)
LDLC SERPL DIRECT ASSAY-MCNC: 70 MG/DL (ref ?–100)
LYMPHOCYTES # BLD AUTO: 1.3 10^3/UL (ref 0.5–4.7)
LYMPHOCYTES NFR BLD AUTO: 19.2 % (ref 13–45)
MCH RBC QN AUTO: 32.6 PG (ref 27–33.4)
MCHC RBC AUTO-ENTMCNC: 34.7 G/DL (ref 32–36)
MCV RBC AUTO: 94 FL (ref 80–97)
MONOCYTES # BLD AUTO: 0.7 10^3/UL (ref 0.1–1.4)
MONOCYTES NFR BLD AUTO: 10.8 % (ref 3–13)
NEUTROPHILS # BLD AUTO: 4.5 10^3/UL (ref 1.7–8.2)
NEUTS SEG NFR BLD AUTO: 68.5 % (ref 42–78)
PLATELET # BLD: 195 10^3/UL (ref 150–450)
POTASSIUM SERPL-SCNC: 4.5 MMOL/L (ref 3.6–5)
RBC # BLD AUTO: 4.69 10^6/UL (ref 4.35–5.55)
SODIUM SERPL-SCNC: 141.7 MMOL/L (ref 137–145)
TOTAL CELLS COUNTED % (AUTO): 100 %
TRIGL SERPL-MCNC: 90 MG/DL (ref ?–150)
VLDLC SERPL CALC-MCNC: 18 MG/DL (ref 10–31)
WBC # BLD AUTO: 6.5 10^3/UL (ref 4–10.5)

## 2018-10-15 RX ADMIN — APIXABAN SCH MG: 5 TABLET, FILM COATED ORAL at 10:10

## 2018-10-15 RX ADMIN — FAMOTIDINE SCH MG: 20 TABLET, FILM COATED ORAL at 10:10

## 2018-10-15 RX ADMIN — METOPROLOL TARTRATE SCH MG: 25 TABLET, FILM COATED ORAL at 10:10

## 2018-10-15 NOTE — PDOC DISCHARGE SUMMARY
General





- Admit/Disc Date/PCP


Admission Date/Primary Care Provider: 


  10/14/18 15:20





  JACKIE JESUS MD





Discharge Date: 10/15/18





- Discharge Diagnosis


(1) Atrial fibrillation with RVR


Is this a current diagnosis for this admission?: Yes   


Summary: 


Begun on metoprolol 25 mg twice daily and Eliquis 5 mg every 12.  Patient to 

follow-up with Dr. Min in 7 days








(2) Hypertension


Is this a current diagnosis for this admission?: Yes   


Summary: 


Lisinopril discontinued patient initiated on metoprolol








- Additional Information


Resuscitation Status: Full Code


Discharge Diet: Cardiac


Discharge Activity: Activity As Tolerated


Prescriptions: 


Apixaban [Eliquis 5 mg Tablet] 5 mg PO Q12 #60 tablet


Metoprolol Tartrate [Lopressor 25 mg Tablet] 25 mg PO Q12 #60 tablet


Home Medications: 








Apixaban [Eliquis 5 mg Tablet] 5 mg PO Q12 #60 tablet 10/15/18 


Metoprolol Tartrate [Lopressor 25 mg Tablet] 25 mg PO Q12 #60 tablet 10/15/18 











History of Present Illness


History of Present Illness: 





OPAL CAMARA is a 63 year old male who presents the emergency room with a 

complaint of fluttering in his chest.  Patient is in excellent health takes 

lisinopril 10 mg a day for hypertension which is his only medical problem.  He 

relates a history over the past year where he would periodically experience a 

thumping in his chest lasting 1 or 2 seconds nothing that lasted longer than 

that.  It did not seem to be related to activity and there was never chest pain 

pressure associated with it.  Today patient was moving some furniture with his 

wife when he experienced a "thump" in his chest followed by a feeling of 

irregular heart rhythm.  He was somewhat lightheaded and this caused him to 

come to the emergency room.  The emergency room patient was found to have 

atrial fibrillation with a rate of 117 he was given a low-dose of IV Cardizem 

and a drip was ordered.  His heart rate decreased into the 90s but remained 

irregular.  Initial laboratory studies were unremarkable his troponin was 

negative.  A request for admission was made.





Hospital Course


Hospital Course: 





Patient presented to the emergency room with atrial fibrillation with a rapid 

ventricular response and presyncopal symptoms.  He was given Cardizem bolus and 

initiated on a Cardizem drip with reasonable rate control.  Consultation was 

obtained with Dr. Min of cardiology who recommended discontinuing the 

Cardizem and initiate beta-blockade due to patient's presyncopal symptoms.  He 

was given gentle IV hydration his lisinopril was discontinued and he was 

initiated on metoprolol 25 mg twice daily.  His resulting rate control was 70-

90 bpm.  Serial troponins were negative.  Cholesterol was obtained which showed 

a triglyceride of 90 a total cholesterol of 117 LDL of 70 and an HDL of 30.  

TSH was 2.2.  The following morning patient felt back to his baseline his EKG 

showed atrial fibrillation with a rate of 74.  Patient was initiated on Eliquis 

at the time of his presentation after discussing the reasons for 

anticoagulation the risks and benefits the patient was agreeable and he was 

started on Eliquis.  He will follow-up with Dr. Min in 7-10 days for 

evaluation and referral to electrophysiology if required.  An echocardiogram 

was performed which showed normal LV function and grade 2 diastolic dysfunction 

however patient was in atrial fibrillation at the time of the study.  With the 

patient on anticoagulation and rate controlled on metoprolol he was stable for 

discharge home.





Physical Exam


Vital Signs: 


 











Temp Pulse Resp BP Pulse Ox


 


 97.8 F   94   16   113/65   99 


 


 10/15/18 07:38  10/15/18 07:38  10/15/18 07:38  10/15/18 07:38  10/15/18 07:38








 Intake & Output











 10/14/18 10/15/18 10/16/18





 06:59 06:59 06:59


 


Intake Total  1300 


 


Balance  1300 


 


Weight  85.8 kg 











General appearance: PRESENT: no acute distress, well-developed, well-nourished


Head exam: PRESENT: atraumatic, normocephalic


Eye exam: PRESENT: conjunctiva pink, EOMI, PERRLA.  ABSENT: scleral icterus


Neck exam: ABSENT: carotid bruit, JVD, lymphadenopathy, thyromegaly


Cardiovascular exam: PRESENT: irregular rhythm.  ABSENT: diastolic murmur, rubs

, systolic murmur


Pulses: PRESENT: normal dorsalis pedis pul


GI/Abdominal exam: PRESENT: normal bowel sounds, soft.  ABSENT: distended, 

guarding, mass, organolmegaly, rebound, tenderness


Extremities exam: PRESENT: full ROM.  ABSENT: calf tenderness, clubbing, pedal 

edema


Neurological exam: PRESENT: alert, awake, oriented to person, oriented to place

, oriented to time, oriented to situation, CN II-XII grossly intact.  ABSENT: 

motor sensory deficit


Skin exam: PRESENT: dry, intact, warm.  ABSENT: cyanosis, rash





Results


Laboratory Results: 


 





 10/15/18 06:45 





 10/15/18 06:45 





 











  10/15/18 10/15/18





  06:45 06:45


 


WBC  6.5 


 


RBC  4.69 


 


Hgb  15.3 


 


Hct  44.0 


 


MCV  94 


 


MCH  32.6 


 


MCHC  34.7 


 


RDW  12.4 


 


Plt Count  195 


 


Seg Neutrophils %  68.5 


 


Lymphocytes %  19.2 


 


Monocytes %  10.8 


 


Eosinophils %  1.0 


 


Basophils %  0.5 


 


Absolute Neutrophils  4.5 


 


Absolute Lymphocytes  1.3 


 


Absolute Monocytes  0.7 


 


Absolute Eosinophils  0.1 


 


Absolute Basophils  0.0 


 


Sodium   141.7


 


Potassium   4.5


 


Chloride   105


 


Carbon Dioxide   30


 


Anion Gap   7


 


BUN   14


 


Creatinine   0.93


 


Est GFR ( Amer)   > 60


 


Est GFR (Non-Af Amer)   > 60


 


Glucose   97


 


Calcium   9.1


 


Magnesium   1.9


 


Triglycerides   90


 


Cholesterol   117.74


 


LDL Cholesterol Direct   70


 


VLDL Cholesterol   18.0


 


HDL Cholesterol   30 L








 











  10/14/18 10/15/18 10/15/18





  17:45 00:21 06:45


 


Troponin I  0.013  < 0.012  < 0.012











Impressions: 


 





Chest X-Ray  10/14/18 12:12


IMPRESSION:  NO ACUTE FINDINGS.


 














Qualifiers





- *


PATIENT BEING DISCHARGED WITH ANY OF THE FOLLOWING DIAGNOSIS: No





Plan


Time Spent: Greater than 30 Minutes

## 2018-10-15 NOTE — EKG REPORT
SEVERITY:- ABNORMAL ECG -

ATRIAL FIBRILLATION

:

Confirmed by: Zoran Jauregui MD 15-Oct-2018 07:41:47

## 2018-10-15 NOTE — PDOC PROGRESS REPORT
Subjective


Progress Note for:: 10/15/18


Subjective:: 





Patient seems to be doing better.  Pt is denying any chest arm or neck 

discomfort.  Patient denying any PND, orthopnea.  Patient denied any sustained 

palpitations, dizziness, syncope, near syncope.  Patient denying any fever 

chills.  Patient denying any other significant discomfort.  





Patient is unfortunately still in atrial fibrillation.  Patient tells me that 

he does not feel any different than when he felt fine.  Therefore it is very 

likely that he has atrial fibrillation for at least some time and not new onset


Review of systems: Rest review of systems negative.





Medications: Medications have been reviewed.


Reason For Visit: 


A. FIB RVR








Physical Exam


Vital Signs: 


 











Temp Pulse Resp BP Pulse Ox


 


 97.8 F   94   16   113/65   99 


 


 10/15/18 10:13  10/15/18 10:13  10/15/18 10:13  10/15/18 10:13  10/15/18 10:13








 Intake & Output











 10/14/18 10/15/18 10/16/18





 06:59 06:59 06:59


 


Intake Total  1300 


 


Balance  1300 


 


Weight  85.8 kg 











Exam: 








GENERAL: well-nourished and in no acute distress.  Alert and oriented x3


HEAD: Atraumatic, normocephalic.  Narrow arch palate and Mallampati class IV.


EYES: Pupils equal round and reactive to light, extraocular movements intact, 

sclera anicteric, conjunctiva are normal.


ENT: TMs normal, nares patent, oropharynx clear without exudates. Moist mucous 

membranes.  No oral ulcerations or bleeding gums noted


NECK: supple without lymphadenopathy.  Trachea is central.  No cervical or 

axillary lymphadenopathy noted.  Carotids are 2+, JVD WNL 


LUNGS: Respiration seems nonlabored, no significant accessory muscle action 

noted.  Breath sounds clear to auscultation bilaterally and equal noted. No 

wheezes rales or rhonchi noted.  No significant dullness noted on percussion.


CHEST: Palpation of the chest wall shows no significant chest wall tenderness. 


HEART: [place] NP, No PSH,  1/6 ARSH aortic area, 1/6 pan systolic murmur mitral 

area, no rubs, no gallops.


ABDOMEN: Soft, no significant tenderness appreciated, normoactive bowel sounds. 

No guarding, no rebound.  No rigidity noted . No masses appreciated.


EXTREMITIES: Pedal pulses are 1-2+, no calf tenderness noted. No clubbing or 

cyanosis. negative pedal edema noted


NEUROLOGICAL: Focused neurological exam showed no significant neurologic 

deficit. Normal speech, no focal weakness appreciated. 


PSYCH: Normal mood, normal affect.  Judgment and insight within normal limits.


SKIN: No significant ecchymosis, skin is noted to be warm.


MUSCULOSKELETAL EXAM: No significant acute joint swelling noted.





Results


Laboratory Results: 


 





 10/15/18 06:45 





 10/15/18 06:45 





 











  10/15/18 10/15/18





  06:45 06:45


 


WBC  6.5 


 


RBC  4.69 


 


Hgb  15.3 


 


Hct  44.0 


 


MCV  94 


 


MCH  32.6 


 


MCHC  34.7 


 


RDW  12.4 


 


Plt Count  195 


 


Seg Neutrophils %  68.5 


 


Lymphocytes %  19.2 


 


Monocytes %  10.8 


 


Eosinophils %  1.0 


 


Basophils %  0.5 


 


Absolute Neutrophils  4.5 


 


Absolute Lymphocytes  1.3 


 


Absolute Monocytes  0.7 


 


Absolute Eosinophils  0.1 


 


Absolute Basophils  0.0 


 


Sodium   141.7


 


Potassium   4.5


 


Chloride   105


 


Carbon Dioxide   30


 


Anion Gap   7


 


BUN   14


 


Creatinine   0.93


 


Est GFR ( Amer)   > 60


 


Est GFR (Non-Af Amer)   > 60


 


Glucose   97


 


Calcium   9.1


 


Magnesium   1.9


 


Triglycerides   90


 


Cholesterol   117.74


 


LDL Cholesterol Direct   70


 


VLDL Cholesterol   18.0


 


HDL Cholesterol   30 L








 











  10/14/18 10/15/18 10/15/18





  17:45 00:21 06:45


 


Troponin I  0.013  < 0.012  < 0.012











Impressions: 


 





Chest X-Ray  10/14/18 12:12


IMPRESSION:  NO ACUTE FINDINGS.


 














Assessment & Plan





- Diagnosis


(1) Atrial fibrillation with RVR


Is this a current diagnosis for this admission?: Yes   





(2) Hypertension


Qualifiers: 


   Hypertension type: essential hypertension   Qualified Code(s): I10 - 

Essential (primary) hypertension   


Is this a current diagnosis for this admission?: Yes   





(3) Near syncope


Is this a current diagnosis for this admission?: Yes   





(4) Epigastric discomfort


Is this a current diagnosis for this admission?: Yes   





(5) Snoring


Is this a current diagnosis for this admission?: Yes   





- Notes


Notes: 


Patient still remains in atrial fibrillation and does not feel any different 

than what he has felt in the recent few months of few weeks, this tends to add 

credence that atrial fibrillation may actually not be new onset.  Feel that 

best option would be either GRACE guided cardioversion or cardioversion after 3-4 

weeks of adequate anticoagulation, versus ablation etc.  These were discussed 

in detail with the patient and his wife.  They are agreeable with this 

approach.  Will arrange for outpatient appointment with Dr. Miguel Sanderson for 

proper management of atrial fibrillation.


Atrial fibrillation with rapid ventricular response: Patient noted to have this 

condition.  Exact onset is not clear.  Patient could have been having at least 

paroxysmal spells for last few months.  At this point, would recommend rate 

control and anticoagulation.  Since patient has history of near syncope, beta-

blocker will be preferred agent for rate control.  Sometimes atrial 

fibrillation with RVR can precipitate neurocardiogenic type reflex.  This 

reflex is usually precipitated because of underfilling of the heart.


Near syncope: Most likely related to atrial fibrillation with RVR.  Patient 

will benefit from improving hydration.  Discussed that underfilling of the 

heart could cause neurocardiogenic type reflex being activated.  Patient 

started on metoprolol for which he seems to be tolerating well.


Hypertension: Continue lisinopril.


Epigastric discomfort: Could be part and parcel of neurocardiogenic reflex but 

patient will benefit from stress test.  Cardiac enzymes has come back negative 

which was reassuring.  This can be scheduled as an outpatient.


Snoring: Discussed association of sleep apnea with increased risk of atrial 

fibrillation as his hypertension.  Patient does have some symptoms and 

oropharyngeal exam indicative of underlying probability of intermediate 

strength of having sleep apnea syndrome.  Patient will benefit from being 

testing for it.








- Time


Time with patient: Greater than 35 minutes - CODE STATUS was discussed, patient 

remains full code.  Surrogate decision-maker unchanged.  Multiple medical 

problems were addressed.  More than 50% of the time spent coordinating care, 

discussing management plans with involved caregivers.  Management plans 

discussed with involved personnels.  Medical decision making was of moderate to 

high complexity, patient's has multiple  comorbidities.


Medications reviewed and adjusted accordingly: Yes

## 2020-03-05 ENCOUNTER — HOSPITAL ENCOUNTER (EMERGENCY)
Dept: HOSPITAL 62 - ER | Age: 65
Discharge: HOME | End: 2020-03-05
Payer: COMMERCIAL

## 2020-03-05 VITALS — SYSTOLIC BLOOD PRESSURE: 138 MMHG | DIASTOLIC BLOOD PRESSURE: 88 MMHG

## 2020-03-05 DIAGNOSIS — Z79.899: ICD-10-CM

## 2020-03-05 DIAGNOSIS — M79.18: ICD-10-CM

## 2020-03-05 DIAGNOSIS — R00.2: Primary | ICD-10-CM

## 2020-03-05 DIAGNOSIS — I10: ICD-10-CM

## 2020-03-05 DIAGNOSIS — M54.2: ICD-10-CM

## 2020-03-05 DIAGNOSIS — R55: ICD-10-CM

## 2020-03-05 DIAGNOSIS — M79.632: ICD-10-CM

## 2020-03-05 DIAGNOSIS — I48.91: ICD-10-CM

## 2020-03-05 DIAGNOSIS — H53.8: ICD-10-CM

## 2020-03-05 DIAGNOSIS — R42: ICD-10-CM

## 2020-03-05 DIAGNOSIS — M25.512: ICD-10-CM

## 2020-03-05 DIAGNOSIS — Z79.01: ICD-10-CM

## 2020-03-05 LAB
ADD MANUAL DIFF: NO
ALBUMIN SERPL-MCNC: 4.4 G/DL (ref 3.5–5)
ALP SERPL-CCNC: 83 U/L (ref 38–126)
ANION GAP SERPL CALC-SCNC: 8 MMOL/L (ref 5–19)
AST SERPL-CCNC: 26 U/L (ref 17–59)
BASOPHILS # BLD AUTO: 0 10^3/UL (ref 0–0.2)
BASOPHILS NFR BLD AUTO: 0.5 % (ref 0–2)
BILIRUB DIRECT SERPL-MCNC: 0.3 MG/DL (ref 0–0.4)
BILIRUB SERPL-MCNC: 0.5 MG/DL (ref 0.2–1.3)
BUN SERPL-MCNC: 19 MG/DL (ref 7–20)
CALCIUM: 9.7 MG/DL (ref 8.4–10.2)
CHLORIDE SERPL-SCNC: 99 MMOL/L (ref 98–107)
CO2 SERPL-SCNC: 31 MMOL/L (ref 22–30)
EOSINOPHIL # BLD AUTO: 0.1 10^3/UL (ref 0–0.6)
EOSINOPHIL NFR BLD AUTO: 1.6 % (ref 0–6)
ERYTHROCYTE [DISTWIDTH] IN BLOOD BY AUTOMATED COUNT: 12.4 % (ref 11.5–14)
GLUCOSE SERPL-MCNC: 168 MG/DL (ref 75–110)
HCT VFR BLD CALC: 49.5 % (ref 37.9–51)
HGB BLD-MCNC: 17.2 G/DL (ref 13.5–17)
INR PPP: 1.05
LYMPHOCYTES # BLD AUTO: 1.6 10^3/UL (ref 0.5–4.7)
LYMPHOCYTES NFR BLD AUTO: 34.1 % (ref 13–45)
MCH RBC QN AUTO: 32.8 PG (ref 27–33.4)
MCHC RBC AUTO-ENTMCNC: 34.7 G/DL (ref 32–36)
MCV RBC AUTO: 95 FL (ref 80–97)
MONOCYTES # BLD AUTO: 0.5 10^3/UL (ref 0.1–1.4)
MONOCYTES NFR BLD AUTO: 10.2 % (ref 3–13)
NEUTROPHILS # BLD AUTO: 2.6 10^3/UL (ref 1.7–8.2)
NEUTS SEG NFR BLD AUTO: 53.6 % (ref 42–78)
PLATELET # BLD: 223 10^3/UL (ref 150–450)
POTASSIUM SERPL-SCNC: 4.3 MMOL/L (ref 3.6–5)
PROT SERPL-MCNC: 7.7 G/DL (ref 6.3–8.2)
PROTHROMBIN TIME: 13.7 SEC (ref 11.4–15.4)
RBC # BLD AUTO: 5.24 10^6/UL (ref 4.35–5.55)
TOTAL CELLS COUNTED % (AUTO): 100 %
WBC # BLD AUTO: 4.8 10^3/UL (ref 4–10.5)

## 2020-03-05 PROCEDURE — 71046 X-RAY EXAM CHEST 2 VIEWS: CPT

## 2020-03-05 PROCEDURE — 84484 ASSAY OF TROPONIN QUANT: CPT

## 2020-03-05 PROCEDURE — 84443 ASSAY THYROID STIM HORMONE: CPT

## 2020-03-05 PROCEDURE — 93010 ELECTROCARDIOGRAM REPORT: CPT

## 2020-03-05 PROCEDURE — 80053 COMPREHEN METABOLIC PANEL: CPT

## 2020-03-05 PROCEDURE — 36415 COLL VENOUS BLD VENIPUNCTURE: CPT

## 2020-03-05 PROCEDURE — 93005 ELECTROCARDIOGRAM TRACING: CPT

## 2020-03-05 PROCEDURE — 83735 ASSAY OF MAGNESIUM: CPT

## 2020-03-05 PROCEDURE — 99285 EMERGENCY DEPT VISIT HI MDM: CPT

## 2020-03-05 PROCEDURE — 85610 PROTHROMBIN TIME: CPT

## 2020-03-05 PROCEDURE — 85025 COMPLETE CBC W/AUTO DIFF WBC: CPT

## 2020-03-05 NOTE — ER DOCUMENT REPORT
Entered by DEVIN CAMARA SCRIBE  03/05/20 2048 





Acting as scribe for:LIZ ESTRAAD MD





ED General





- General


Chief Complaint: Chest Pain > 30


Stated Complaint: IRREGULAR HEART BEAT/LEFT ARM PAIN


Time Seen by Provider: 03/05/20 19:02


Primary Care Provider: 


JACKIE JESUS MD [Primary Care Provider] - Follow up as needed


LUZMA QUEVEDO MD [ACTIVE PROVISIONAL STAFF] - Follow up tomorrow


Mode of Arrival: Ambulatory


Information source: Patient


Notes: 





This 64 year old male patient with a history of hypertension and atrial 

fibrillation on Eliquis and Metoprolol presents to the ED today with complaints 

of discomfort in his left neck, left shoulder, and down his left arm that lasted

approximately x30 minutes that occurred at home prior to arrival. Patient states

that tonight was the first time he experienced the discomfort down is LUE. 

Patient states that for the past x1 week he has been having episodes of 

irregular and rapid heart rate that progresses into lightheadedness with blurred

vision and feeling like he is going to pass out. Patient notes that these 

episodes last x10-20 minutes and then his heart rate goes back to normal 

according to his Fitbit. Patient states that he has been having these symptoms 

for a while, but they have been more frequent this past week. Patient reports 

some shortness of breath, but denies chest pain. Patient states that he had an 

exercise stress test done x5 months at Dr. Quevedo's office and that it was 

normal. Patient currently denies any symptoms at this time.








Patient specifically describes the pain going from the left neck into the 

shoulder arm and forearm as originating in the posterior lateral part of the 

neck, running along the upper posterior aspect of the trapezius muscle into the 

shoulder.  He states it felt like it involves the entire arm and forearm.  There

was a little shortness of breath with this, but no nauseousness.  It lasted 

perhaps 30 minutes.


TRAVEL OUTSIDE OF THE [place] IN LAST 30 DAYS: No





- Related Data


Allergies/Adverse Reactions: 


                                        





No Known Allergies Allergy (Verified 03/05/20 18:59)


   











Past Medical History





- General


Information source: Patient





- Social History


Smoking Status: Never Smoker


Cigarette use (# per day): No


Chew tobacco use (# tins/day): No


Smoking Education Provided: No


Frequency of alcohol use: Occasional


Drug Abuse: None


Family History: Reviewed & Not Pertinent


Patient has suicidal ideation: No


Patient has homicidal ideation: No





- Past Medical History


Cardiac Medical History: Reports: Hx Hypertension


Past Surgical History: Reports: Hx Tonsillectomy





Review of Systems





- Review of Systems


Constitutional: No symptoms reported


EENT: See HPI, Blurred vision


Cardiovascular: See HPI, Syncope - Near, Lightheaded.  denies: Chest pain


Respiratory: See HPI, Short of breath


Gastrointestinal: See HPI.  denies: Nausea


Genitourinary: No symptoms reported


Male Genitourinary: No symptoms reported


Musculoskeletal: See HPI, Neck pain, Other - Left shoulder and arm pain


Skin: No symptoms reported


Hematologic/Lymphatic: No symptoms reported


Neurological/Psychological: No symptoms reported


-: Yes All other systems reviewed and negative





Physical Exam





- Vital signs


Vitals: 


                                        











Temp Pulse Resp BP Pulse Ox


 


 97.7 F   76   18   154/88 H  97 


 


 03/05/20 18:47  03/05/20 18:47  03/05/20 18:47  03/05/20 18:47  03/05/20 18:47














- General


General appearance: Appears well, Alert





- HEENT


Head: Normocephalic, Atraumatic


Eyes: Normal


Pupils: PERRL


Neck: Supple, Other - Palpating the left posterior cervical neck muscles and the

upper posterior trapezius muscle is a little tender compared to the right side.





- Respiratory


Respiratory status: No respiratory distress


Chest status: Nontender


Breath sounds: Normal


Chest palpation: Normal





- Cardiovascular


Rhythm: Regular


Heart sounds: Normal auscultation


Murmur: No





- Abdominal


Inspection: Normal


Distension: No distension


Bowel sounds: Normal


Tenderness: Nontender - Abdomen soft


Organomegaly: No organomegaly





- Back


Back: Normal, Nontender





- Extremities


General upper extremity: Normal inspection, Nontender - Palpating the left 

shoulder and upper arm is not tender.


General lower extremity: Normal inspection





- Neurological


Neuro grossly intact: Yes





- Psychological


Associated symptoms: Normal affect, Normal mood





- Skin


Skin Temperature: Warm


Skin Moisture: Dry


Skin Color: Normal





Course





- Re-evaluation


Re-evalutation: 





03/05/20 22:49


On reviewing the patient's records, I found that when he came to the hospital in

October 2018 with atrial fibrillation, his heart rate was 113 before any 

medications.  He very well could be having atrial fibrillation episodes with a 

heart rate of 100-1 05 particularly since he is taking metoprolol.





- Vital Signs


Vital signs: 


                                        











Temp Pulse Resp BP Pulse Ox


 


 97.7 F   76   20   138/88 H  98 


 


 03/05/20 18:47  03/05/20 18:47  03/05/20 22:01  03/05/20 22:01  03/05/20 22:01














- Laboratory


Result Diagrams: 


                                 03/05/20 19:14





                                 03/05/20 19:14


Laboratory results interpreted by me: 


                                        











  03/05/20 03/05/20





  19:14 19:14


 


Hgb  17.2 H 


 


Carbon Dioxide   31 H


 


Glucose   168 H














- Diagnostic Test


Radiology reviewed: Image reviewed, Reports reviewed - Chest x-ray does not show

acute cardiopulmonary process





- EKG Interpretation by Me


EKG shows normal: Sinus rhythm, Axis, Intervals, QRS Complexes, ST-T Waves


Rate: Normal - 78


Rhythm: NSR





Discharge





- Discharge


Clinical Impression: 


 Heart palpitations, History of atrial fibrillation





Condition: Stable


Disposition: HOME, SELF-CARE


Additional Instructions: 


Call your cardiologist in the morning to be seen for a Holter monitor.


Have someone drive you to your appointment.








RETURN TO THE EMERGENCY ROOM IF ANY NEW OR WORSENING SYMPTOMS.








Referrals: 


JACKIE JESUS MD [Primary Care Provider] - Follow up as needed


LUZMA QUEVEDO MD [ACTIVE PROVISIONAL STAFF] - Follow up tomorrow





I personally performed the services described in the documentation, reviewed and

edited the documentation which was dictated to the scribe in my presence, and it

accurately records my words and actions.

## 2020-03-05 NOTE — RADIOLOGY REPORT (SQ)
EXAM DESCRIPTION:  CHEST 2 VIEWS



COMPLETED DATE/TIME:  3/5/2020 7:23 pm



REASON FOR STUDY:  chest pain



COMPARISON:  8/21/2007.



EXAM PARAMETERS:  NUMBER OF VIEWS: two views

TECHNIQUE: Digital Frontal and Lateral radiographic views of the chest acquired.

RADIATION DOSE: NA

LIMITATIONS: none



FINDINGS:  LUNGS AND PLEURA: No opacities, masses or pneumothorax. No pleural effusion.

MEDIASTINUM AND HILAR STRUCTURES: No masses or contour abnormalities.

HEART AND VASCULAR STRUCTURES: Heart normal size.  No evidence for failure.

BONES: No acute findings.

HARDWARE: None in the chest.

OTHER: No other significant finding.



IMPRESSION:  NO ACUTE RADIOGRAPHIC FINDING IN THE CHEST.



TECHNICAL DOCUMENTATION:  JOB ID:  [id number]

 2011 [employer]- All Rights Reserved



Reading location - IP/workstation name: [email or web address]

## 2020-03-05 NOTE — ER DOCUMENT REPORT
ED Medical Screen (RME)





- General


Chief Complaint: Palpitations


Stated Complaint: IRREGULAR HEART BEAT/LEFT ARM PAIN


Time Seen by Provider: 03/05/20 19:02


Primary Care Provider: 


JACKIE JESUS MD [Primary Care Provider] - Follow up as needed


Notes: 





HPI: 64-year-old male with history of hypertension, atrial fibrillation who is 

on Eliquis and metoprolol presenting for evaluation of discomfort in the left 

neck left shoulder down the left arm that occurred today when he returned from 

driving to [place].  Patient states over the last 3 to 4 days he has had 

episodes where he feels like his heartbeat has been somewhat irregular and rapid

and during these episodes he will become slightly lightheaded with blurred 

vision and feel like he is going to pass out.  He has not lost consciousness.  

Patient currently denies discomfort in the left shoulder left arm or across the 

anterior chest.  No shortness of breath at this time.  He follows with Dr. Quevedo cardiology and states he did have a stress test for 5 months ago that 

was normal





I have greeted and performed a rapid initial assessment of this patient.  A 

comprehensive ED assessment and evaluation of the patient, analysis of test 

results and completion of the medical decision making process will be conducted 

by additional ED providers








PHYSICAL EXAMINATION:





GENERAL: Well-appearing, well-nourished and in mild acute distress.


HEAD: Atraumatic, normocephalic.


EYES:  sclera anicteric, conjunctiva are normal.


ENT: Moist mucous membranes.


NECK: Normal range of motion


LUNGS: Normal work of breathing, clear to auscultation


HEART: 2+ radial pulses bilaterally, regular rate and rhythm


ABD: limited by positioning for exam in triage.  


EXTREMITIES: no pitting or edema.  No cyanosis.


NEUROLOGICAL: No focal neurological deficits. Moves all extremities 

spontaneously and on command.


PSYCH: Normal mood, normal affect.


SKIN: Warm, Dry, normal turgor, no rashes or lesions noted.








TRAVEL OUTSIDE OF THE [place] IN LAST 30 DAYS: No





- Related Data


Allergies/Adverse Reactions: 


                                        





No Known Allergies Allergy (Verified 03/05/20 18:59)


   











Past Medical History





- Past Medical History


Cardiac Medical History: Reports: Hx Hypertension


Renal/ Medical History: Denies: Hx Peritoneal Dialysis


Psychiatric Medical History: 


   Denies: Hx Depression





Physical Exam





- Vital signs


Vitals: 





                                        











Temp Pulse Resp BP Pulse Ox


 


 97.7 F   76   18   154/88 H  97 


 


 03/05/20 18:47  03/05/20 18:47  03/05/20 18:47  03/05/20 18:47  03/05/20 18:47














Course





- Vital Signs


Vital signs: 





                                        











Temp Pulse Resp BP Pulse Ox


 


 97.7 F   76   18   154/88 H  97 


 


 03/05/20 18:47  03/05/20 18:47  03/05/20 18:47  03/05/20 18:47  03/05/20 18:47














Doctor's Discharge





- Discharge


Referrals: 


JACKIE JESUS MD [Primary Care Provider] - Follow up as needed